# Patient Record
Sex: FEMALE | Race: WHITE
[De-identification: names, ages, dates, MRNs, and addresses within clinical notes are randomized per-mention and may not be internally consistent; named-entity substitution may affect disease eponyms.]

---

## 2018-11-20 PROBLEM — Z00.00 ENCOUNTER FOR PREVENTIVE HEALTH EXAMINATION: Status: ACTIVE | Noted: 2018-11-20

## 2018-12-07 ENCOUNTER — APPOINTMENT (OUTPATIENT)
Dept: GYNECOLOGIC ONCOLOGY | Facility: CLINIC | Age: 78
End: 2018-12-07
Payer: MEDICARE

## 2018-12-07 VITALS
HEIGHT: 66 IN | DIASTOLIC BLOOD PRESSURE: 87 MMHG | WEIGHT: 168 LBS | HEART RATE: 103 BPM | BODY MASS INDEX: 27 KG/M2 | OXYGEN SATURATION: 95 % | SYSTOLIC BLOOD PRESSURE: 154 MMHG | RESPIRATION RATE: 16 BRPM

## 2018-12-07 PROCEDURE — 99213 OFFICE O/P EST LOW 20 MIN: CPT

## 2018-12-07 RX ORDER — ATORVASTATIN CALCIUM 10 MG/1
10 TABLET, FILM COATED ORAL
Refills: 0 | Status: ACTIVE | COMMUNITY

## 2018-12-07 RX ORDER — ESOMEPRAZOLE MAGNESIUM 40 MG/1
40 CAPSULE, DELAYED RELEASE ORAL
Refills: 0 | Status: ACTIVE | COMMUNITY

## 2018-12-07 RX ORDER — ANASTROZOLE TABLETS 1 MG/1
1 TABLET ORAL
Refills: 0 | Status: ACTIVE | COMMUNITY

## 2018-12-07 RX ORDER — VALSARTAN 160 MG/1
160 TABLET ORAL
Refills: 0 | Status: ACTIVE | COMMUNITY

## 2022-02-08 PROBLEM — C54.1 ENDOMETRIAL CANCER: Status: ACTIVE | Noted: 2018-12-07

## 2022-02-09 ENCOUNTER — APPOINTMENT (OUTPATIENT)
Dept: GYNECOLOGIC ONCOLOGY | Facility: CLINIC | Age: 82
End: 2022-02-09
Payer: MEDICARE

## 2022-02-09 VITALS
WEIGHT: 168 LBS | SYSTOLIC BLOOD PRESSURE: 130 MMHG | HEART RATE: 91 BPM | OXYGEN SATURATION: 95 % | BODY MASS INDEX: 27 KG/M2 | HEIGHT: 66 IN | DIASTOLIC BLOOD PRESSURE: 80 MMHG | RESPIRATION RATE: 16 BRPM

## 2022-02-09 DIAGNOSIS — C54.1 MALIGNANT NEOPLASM OF ENDOMETRIUM: ICD-10-CM

## 2022-02-09 PROCEDURE — 99202 OFFICE O/P NEW SF 15 MIN: CPT

## 2022-02-09 NOTE — PHYSICAL EXAM
[Chaperone Present] : A chaperone was present in the examining room during all aspects of the physical examination [Absent] : Adnexa(ae): Absent [Normal] : Bimanual Exam: Normal [FreeTextEntry1] : Conchita Cortez Medical assistant chaperoned during gynecologic exam.

## 2022-02-09 NOTE — HISTORY OF PRESENT ILLNESS
[FreeTextEntry1] : This 82 y/o with a hx of endometrial cancer since 2005 was last seen in December 2018 for an annual svl visit. Patient was advised to follow up in 1 year but failed to do so. Patient returns for an overdue svl visit. She denies any pain or VB. Patient feels well from a gynecological stand point.

## 2022-02-09 NOTE — END OF VISIT
[FreeTextEntry3] : Written by Conchita Cortez, acting as a scribe for Dr. Graham Dupree \par This note accurately reflects the work and decisions made by me.

## 2022-02-09 NOTE — ASSESSMENT
[FreeTextEntry1] : Patient is doing well from a gynecological stand point. I advised the patient that she is over 15 years out from her cancer diagnosis and does not need to be followed by a gyn oncologist any longer. Patient would be followed by a primary gynecologist. Patient understands if she develops any symptoms or concerns in the future she may return to see me at any time.

## 2024-12-18 ENCOUNTER — APPOINTMENT (OUTPATIENT)
Dept: CT IMAGING | Age: 84
End: 2024-12-18
Payer: MEDICARE

## 2024-12-18 ENCOUNTER — HOSPITAL ENCOUNTER (OUTPATIENT)
Age: 84
Setting detail: OBSERVATION
Discharge: HOME | End: 2024-12-19
Attending: PEDIATRICS | Admitting: STUDENT IN AN ORGANIZED HEALTH CARE EDUCATION/TRAINING PROGRAM
Payer: MEDICARE

## 2024-12-18 ENCOUNTER — APPOINTMENT (OUTPATIENT)
Dept: GENERAL RADIOLOGY | Age: 84
End: 2024-12-18
Payer: MEDICARE

## 2024-12-18 DIAGNOSIS — E86.1 HYPOVOLEMIA: ICD-10-CM

## 2024-12-18 DIAGNOSIS — R55 SYNCOPE, UNSPECIFIED SYNCOPE TYPE: ICD-10-CM

## 2024-12-18 DIAGNOSIS — I95.1 ORTHOSTATIC HYPOTENSION: ICD-10-CM

## 2024-12-18 DIAGNOSIS — R00.1 BRADYCARDIA: ICD-10-CM

## 2024-12-18 DIAGNOSIS — R55 SYNCOPE AND COLLAPSE: Primary | ICD-10-CM

## 2024-12-18 DIAGNOSIS — R11.2 NAUSEA AND VOMITING, UNSPECIFIED VOMITING TYPE: ICD-10-CM

## 2024-12-18 LAB
ALBUMIN SERPL-MCNC: 3.8 G/DL (ref 3.5–5.2)
ALP SERPL-CCNC: 69 U/L (ref 35–104)
ALT SERPL-CCNC: 7 U/L (ref 5–33)
ANION GAP SERPL CALCULATED.3IONS-SCNC: 11 MMOL/L (ref 7–19)
AST SERPL-CCNC: 20 U/L (ref 5–32)
BACTERIA URNS QL MICRO: NEGATIVE /HPF
BASOPHILS # BLD: 0 K/UL (ref 0–0.2)
BASOPHILS NFR BLD: 0.2 % (ref 0–1)
BILIRUB SERPL-MCNC: 0.5 MG/DL (ref 0.2–1.2)
BILIRUB UR QL STRIP: NEGATIVE
BUN SERPL-MCNC: 25 MG/DL (ref 8–23)
CALCIUM SERPL-MCNC: 8.9 MG/DL (ref 8.8–10.2)
CHLORIDE SERPL-SCNC: 108 MMOL/L (ref 98–111)
CLARITY UR: CLEAR
CO2 SERPL-SCNC: 23 MMOL/L (ref 22–29)
COLOR UR: YELLOW
CREAT SERPL-MCNC: 0.9 MG/DL (ref 0.5–0.9)
CRYSTALS URNS MICRO: NORMAL /HPF
EOSINOPHIL # BLD: 0.2 K/UL (ref 0–0.6)
EOSINOPHIL NFR BLD: 1.1 % (ref 0–5)
EPI CELLS #/AREA URNS AUTO: 1 /HPF (ref 0–5)
ERYTHROCYTE [DISTWIDTH] IN BLOOD BY AUTOMATED COUNT: 13.2 % (ref 11.5–14.5)
GLUCOSE SERPL-MCNC: 134 MG/DL (ref 70–99)
GLUCOSE UR STRIP.AUTO-MCNC: NEGATIVE MG/DL
HCT VFR BLD AUTO: 39.1 % (ref 37–47)
HGB BLD-MCNC: 12.5 G/DL (ref 12–16)
HGB UR STRIP.AUTO-MCNC: NEGATIVE MG/L
HYALINE CASTS #/AREA URNS AUTO: 2 /HPF (ref 0–8)
IMM GRANULOCYTES # BLD: 0.1 K/UL
KETONES UR STRIP.AUTO-MCNC: ABNORMAL MG/DL
LACTATE BLDV-SCNC: 1.6 MMOL/L (ref 0.5–1.9)
LEUKOCYTE ESTERASE UR QL STRIP.AUTO: ABNORMAL
LYMPHOCYTES # BLD: 1.5 K/UL (ref 1.1–4.5)
LYMPHOCYTES NFR BLD: 9.5 % (ref 20–40)
MAGNESIUM SERPL-MCNC: 2.1 MG/DL (ref 1.6–2.4)
MCH RBC QN AUTO: 31 PG (ref 27–31)
MCHC RBC AUTO-ENTMCNC: 32 G/DL (ref 33–37)
MCV RBC AUTO: 97 FL (ref 81–99)
MONOCYTES # BLD: 0.9 K/UL (ref 0–0.9)
MONOCYTES NFR BLD: 5.4 % (ref 0–10)
NEUTROPHILS # BLD: 13.1 K/UL (ref 1.5–7.5)
NEUTS SEG NFR BLD: 83.3 % (ref 50–65)
NITRITE UR QL STRIP.AUTO: NEGATIVE
PH UR STRIP.AUTO: 5.5 [PH] (ref 5–8)
PLATELET # BLD AUTO: 217 K/UL (ref 130–400)
PMV BLD AUTO: 9.2 FL (ref 9.4–12.3)
POTASSIUM SERPL-SCNC: 3.6 MMOL/L (ref 3.5–5)
PROT SERPL-MCNC: 6 G/DL (ref 6.4–8.3)
PROT UR STRIP.AUTO-MCNC: NEGATIVE MG/DL
RBC # BLD AUTO: 4.03 M/UL (ref 4.2–5.4)
RBC #/AREA URNS AUTO: 3 /HPF (ref 0–4)
SODIUM SERPL-SCNC: 142 MMOL/L (ref 136–145)
SP GR UR STRIP.AUTO: 1.02 (ref 1–1.03)
TROPONIN, HIGH SENSITIVITY: 13 NG/L (ref 0–14)
UROBILINOGEN UR STRIP.AUTO-MCNC: 0.2 E.U./DL
WBC # BLD AUTO: 15.7 K/UL (ref 4.8–10.8)
WBC #/AREA URNS AUTO: 1 /HPF (ref 0–5)

## 2024-12-18 PROCEDURE — 80053 COMPREHEN METABOLIC PANEL: CPT

## 2024-12-18 PROCEDURE — 93005 ELECTROCARDIOGRAM TRACING: CPT | Performed by: PEDIATRICS

## 2024-12-18 PROCEDURE — 84484 ASSAY OF TROPONIN QUANT: CPT

## 2024-12-18 PROCEDURE — 96374 THER/PROPH/DIAG INJ IV PUSH: CPT

## 2024-12-18 PROCEDURE — 99285 EMERGENCY DEPT VISIT HI MDM: CPT

## 2024-12-18 PROCEDURE — 85025 COMPLETE CBC W/AUTO DIFF WBC: CPT

## 2024-12-18 PROCEDURE — 96361 HYDRATE IV INFUSION ADD-ON: CPT

## 2024-12-18 PROCEDURE — 83605 ASSAY OF LACTIC ACID: CPT

## 2024-12-18 PROCEDURE — 2580000003 HC RX 258: Performed by: PEDIATRICS

## 2024-12-18 PROCEDURE — 71045 X-RAY EXAM CHEST 1 VIEW: CPT

## 2024-12-18 PROCEDURE — 36415 COLL VENOUS BLD VENIPUNCTURE: CPT

## 2024-12-18 PROCEDURE — 83735 ASSAY OF MAGNESIUM: CPT

## 2024-12-18 PROCEDURE — 6360000002 HC RX W HCPCS: Performed by: PEDIATRICS

## 2024-12-18 PROCEDURE — 81001 URINALYSIS AUTO W/SCOPE: CPT

## 2024-12-18 PROCEDURE — 70450 CT HEAD/BRAIN W/O DYE: CPT

## 2024-12-18 RX ORDER — VALSARTAN AND HYDROCHLOROTHIAZIDE 160; 12.5 MG/1; MG/1
1 TABLET, FILM COATED ORAL DAILY
Status: ON HOLD | COMMUNITY
End: 2024-12-19 | Stop reason: HOSPADM

## 2024-12-18 RX ORDER — ATORVASTATIN CALCIUM 10 MG/1
10 TABLET, FILM COATED ORAL DAILY
COMMUNITY

## 2024-12-18 RX ORDER — ANASTROZOLE 1 MG/1
1 TABLET ORAL DAILY
COMMUNITY

## 2024-12-18 RX ORDER — 0.9 % SODIUM CHLORIDE 0.9 %
1000 INTRAVENOUS SOLUTION INTRAVENOUS ONCE
Status: COMPLETED | OUTPATIENT
Start: 2024-12-18 | End: 2024-12-19

## 2024-12-18 RX ORDER — ONDANSETRON 2 MG/ML
4 INJECTION INTRAMUSCULAR; INTRAVENOUS ONCE
Status: COMPLETED | OUTPATIENT
Start: 2024-12-18 | End: 2024-12-18

## 2024-12-18 RX ORDER — ASPIRIN 81 MG/1
81 TABLET, CHEWABLE ORAL DAILY
COMMUNITY

## 2024-12-18 RX ADMIN — SODIUM CHLORIDE 1000 ML: 9 INJECTION, SOLUTION INTRAVENOUS at 22:19

## 2024-12-18 RX ADMIN — ONDANSETRON 4 MG: 2 INJECTION INTRAMUSCULAR; INTRAVENOUS at 22:36

## 2024-12-18 ASSESSMENT — ENCOUNTER SYMPTOMS
ABDOMINAL PAIN: 0
SHORTNESS OF BREATH: 0
BACK PAIN: 0
COUGH: 0
RHINORRHEA: 0
NAUSEA: 1
VOMITING: 0
DIARRHEA: 1

## 2024-12-18 ASSESSMENT — PAIN - FUNCTIONAL ASSESSMENT: PAIN_FUNCTIONAL_ASSESSMENT: NONE - DENIES PAIN

## 2024-12-19 ENCOUNTER — APPOINTMENT (OUTPATIENT)
Dept: CT IMAGING | Age: 84
End: 2024-12-19
Payer: MEDICARE

## 2024-12-19 ENCOUNTER — APPOINTMENT (OUTPATIENT)
Age: 84
End: 2024-12-19
Attending: STUDENT IN AN ORGANIZED HEALTH CARE EDUCATION/TRAINING PROGRAM
Payer: MEDICARE

## 2024-12-19 VITALS
RESPIRATION RATE: 16 BRPM | BODY MASS INDEX: 26.36 KG/M2 | HEART RATE: 82 BPM | OXYGEN SATURATION: 96 % | TEMPERATURE: 97.6 F | SYSTOLIC BLOOD PRESSURE: 138 MMHG | HEIGHT: 66 IN | DIASTOLIC BLOOD PRESSURE: 49 MMHG | WEIGHT: 164 LBS

## 2024-12-19 PROBLEM — R55 SYNCOPE AND COLLAPSE: Status: RESOLVED | Noted: 2024-12-19 | Resolved: 2024-12-19

## 2024-12-19 PROBLEM — R55 SYNCOPE AND COLLAPSE: Status: ACTIVE | Noted: 2024-12-19

## 2024-12-19 LAB
ALBUMIN SERPL-MCNC: 3.8 G/DL (ref 3.5–5.2)
ALP SERPL-CCNC: 70 U/L (ref 35–104)
ALT SERPL-CCNC: 7 U/L (ref 5–33)
ANION GAP SERPL CALCULATED.3IONS-SCNC: 12 MMOL/L (ref 7–19)
AST SERPL-CCNC: 16 U/L (ref 5–32)
BASOPHILS # BLD: 0 K/UL (ref 0–0.2)
BASOPHILS NFR BLD: 0.1 % (ref 0–1)
BILIRUB SERPL-MCNC: 0.9 MG/DL (ref 0.2–1.2)
BUN SERPL-MCNC: 26 MG/DL (ref 8–23)
CALCIUM SERPL-MCNC: 8.1 MG/DL (ref 8.8–10.2)
CHLORIDE SERPL-SCNC: 106 MMOL/L (ref 98–111)
CO2 SERPL-SCNC: 23 MMOL/L (ref 22–29)
CREAT SERPL-MCNC: 0.7 MG/DL (ref 0.5–0.9)
ECHO AO ASC DIAM: 3.1 CM
ECHO AO ASCENDING AORTA INDEX: 1.68 CM/M2
ECHO AO ROOT DIAM: 2.1 CM
ECHO AO ROOT INDEX: 1.14 CM/M2
ECHO AO SINUS VALSALVA DIAM: 3.4 CM
ECHO AO SINUS VALSALVA INDEX: 1.85 CM/M2
ECHO AO ST JNCT DIAM: 3.2 CM
ECHO AV AREA PEAK VELOCITY: 2.5 CM2
ECHO AV AREA VTI: 3.3 CM2
ECHO AV AREA/BSA PEAK VELOCITY: 1.4 CM2/M2
ECHO AV AREA/BSA VTI: 1.8 CM2/M2
ECHO AV MEAN GRADIENT: 4 MMHG
ECHO AV MEAN VELOCITY: 1 M/S
ECHO AV PEAK GRADIENT: 7 MMHG
ECHO AV PEAK VELOCITY: 1.3 M/S
ECHO AV VELOCITY RATIO: 0.77
ECHO AV VTI: 25.5 CM
ECHO BSA: 1.86 M2
ECHO IVC PROX: 1.3 CM
ECHO LA AREA 2C: 16.8 CM2
ECHO LA AREA 4C: 18.9 CM2
ECHO LA DIAMETER INDEX: 1.85 CM/M2
ECHO LA DIAMETER: 3.4 CM
ECHO LA MAJOR AXIS: 6.8 CM
ECHO LA MINOR AXIS: 6.1 CM
ECHO LA TO AORTIC ROOT RATIO: 1.62
ECHO LA VOL BP: 40 ML (ref 22–52)
ECHO LA VOL MOD A2C: 39 ML (ref 22–52)
ECHO LA VOL MOD A4C: 42 ML (ref 22–52)
ECHO LA VOL/BSA BIPLANE: 22 ML/M2 (ref 16–34)
ECHO LA VOLUME INDEX MOD A2C: 21 ML/M2 (ref 16–34)
ECHO LA VOLUME INDEX MOD A4C: 23 ML/M2 (ref 16–34)
ECHO LV E' LATERAL VELOCITY: 5.87 CM/S
ECHO LV E' SEPTAL VELOCITY: 5.66 CM/S
ECHO LV EDV A2C: 83 ML
ECHO LV EDV A4C: 93 ML
ECHO LV EDV INDEX A4C: 51 ML/M2
ECHO LV EDV NDEX A2C: 45 ML/M2
ECHO LV EJECTION FRACTION A2C: 55 %
ECHO LV EJECTION FRACTION A4C: 55 %
ECHO LV EJECTION FRACTION BIPLANE: 55 % (ref 55–100)
ECHO LV ESV A2C: 37 ML
ECHO LV ESV A4C: 42 ML
ECHO LV ESV INDEX A2C: 20 ML/M2
ECHO LV ESV INDEX A4C: 23 ML/M2
ECHO LV FRACTIONAL SHORTENING: 17 % (ref 28–44)
ECHO LV INTERNAL DIMENSION DIASTOLE INDEX: 2.5 CM/M2
ECHO LV INTERNAL DIMENSION DIASTOLIC: 4.6 CM (ref 3.9–5.3)
ECHO LV INTERNAL DIMENSION SYSTOLIC INDEX: 2.07 CM/M2
ECHO LV INTERNAL DIMENSION SYSTOLIC: 3.8 CM
ECHO LV ISOVOLUMETRIC RELAXATION TIME (IVRT): 106 MS
ECHO LV IVSD: 1.7 CM (ref 0.6–0.9)
ECHO LV MASS 2D: 284.8 G (ref 67–162)
ECHO LV MASS INDEX 2D: 154.8 G/M2 (ref 43–95)
ECHO LV POSTERIOR WALL DIASTOLIC: 1.3 CM (ref 0.6–0.9)
ECHO LV RELATIVE WALL THICKNESS RATIO: 0.57
ECHO LVOT AREA: 3.1 CM2
ECHO LVOT AV VTI INDEX: 1.05
ECHO LVOT DIAM: 2 CM
ECHO LVOT MEAN GRADIENT: 2 MMHG
ECHO LVOT PEAK GRADIENT: 4 MMHG
ECHO LVOT PEAK VELOCITY: 1 M/S
ECHO LVOT STROKE VOLUME INDEX: 45.9 ML/M2
ECHO LVOT SV: 84.5 ML
ECHO LVOT VTI: 26.9 CM
ECHO MV A VELOCITY: 1.21 M/S
ECHO MV ANNULUS DIAMETER: 2.2 CM
ECHO MV AREA VTI: 4.1 CM2
ECHO MV E DECELERATION TIME (DT): 160 MS
ECHO MV E VELOCITY: 0.63 M/S
ECHO MV E/A RATIO: 0.52
ECHO MV E/E' LATERAL: 10.73
ECHO MV E/E' RATIO (AVERAGED): 10.93
ECHO MV E/E' SEPTAL: 11.13
ECHO MV LVOT VTI INDEX: 0.77
ECHO MV MAX VELOCITY: 1 M/S
ECHO MV MEAN GRADIENT: 2 MMHG
ECHO MV MEAN VELOCITY: 0.6 M/S
ECHO MV PEAK GRADIENT: 4 MMHG
ECHO MV VTI: 20.7 CM
ECHO RA AREA 4C: 10.9 CM2
ECHO RA END SYSTOLIC VOLUME APICAL 4 CHAMBER INDEX BSA: 12 ML/M2
ECHO RA MAJOR AXIS INDEX: 2.55 CM/M2
ECHO RA MAJOR AXIS: 4.7 CM
ECHO RA MINOR AXIS INDEX: 1.25 CM/M2
ECHO RA MINOR AXIS: 2.3 CM
ECHO RA VOLUME: 22 ML
ECHO RV BASAL DIMENSION: 2.3 CM
ECHO RV INTERNAL DIMENSION: 3 CM
ECHO RV LONGITUDINAL DIMENSION: 7.7 CM
ECHO RV MID DIMENSION: 2 CM
ECHO RV TAPSE: 1.9 CM (ref 1.7–?)
EKG P AXIS: 52 DEGREES
EKG P AXIS: 56 DEGREES
EKG P-R INTERVAL: 160 MS
EKG P-R INTERVAL: 168 MS
EKG Q-T INTERVAL: 370 MS
EKG Q-T INTERVAL: 374 MS
EKG QRS DURATION: 88 MS
EKG QRS DURATION: 90 MS
EKG QTC CALCULATION (BAZETT): 424 MS
EKG QTC CALCULATION (BAZETT): 426 MS
EKG T AXIS: 64 DEGREES
EKG T AXIS: 95 DEGREES
EOSINOPHIL # BLD: 0 K/UL (ref 0–0.6)
EOSINOPHIL NFR BLD: 0.6 % (ref 0–5)
ERYTHROCYTE [DISTWIDTH] IN BLOOD BY AUTOMATED COUNT: 13.3 % (ref 11.5–14.5)
GLUCOSE SERPL-MCNC: 126 MG/DL (ref 70–99)
HCT VFR BLD AUTO: 38.4 % (ref 37–47)
HGB BLD-MCNC: 12.4 G/DL (ref 12–16)
IMM GRANULOCYTES # BLD: 0 K/UL
LYMPHOCYTES # BLD: 0.3 K/UL (ref 1.1–4.5)
LYMPHOCYTES NFR BLD: 4.6 % (ref 20–40)
MCH RBC QN AUTO: 31.1 PG (ref 27–31)
MCHC RBC AUTO-ENTMCNC: 32.3 G/DL (ref 33–37)
MCV RBC AUTO: 96.2 FL (ref 81–99)
MONOCYTES # BLD: 0.2 K/UL (ref 0–0.9)
MONOCYTES NFR BLD: 3 % (ref 0–10)
NEUTROPHILS # BLD: 6.6 K/UL (ref 1.5–7.5)
NEUTS SEG NFR BLD: 91.3 % (ref 50–65)
PHOSPHATE SERPL-MCNC: 3.2 MG/DL (ref 2.5–4.5)
PLATELET # BLD AUTO: 179 K/UL (ref 130–400)
PMV BLD AUTO: 9.7 FL (ref 9.4–12.3)
POTASSIUM SERPL-SCNC: 3.7 MMOL/L (ref 3.5–5)
PROT SERPL-MCNC: 5.7 G/DL (ref 6.4–8.3)
RBC # BLD AUTO: 3.99 M/UL (ref 4.2–5.4)
SODIUM SERPL-SCNC: 141 MMOL/L (ref 136–145)
TROPONIN, HIGH SENSITIVITY: 8 NG/L (ref 0–14)
WBC # BLD AUTO: 7.2 K/UL (ref 4.8–10.8)

## 2024-12-19 PROCEDURE — 6370000000 HC RX 637 (ALT 250 FOR IP): Performed by: STUDENT IN AN ORGANIZED HEALTH CARE EDUCATION/TRAINING PROGRAM

## 2024-12-19 PROCEDURE — 96376 TX/PRO/DX INJ SAME DRUG ADON: CPT

## 2024-12-19 PROCEDURE — 6360000004 HC RX CONTRAST MEDICATION: Performed by: PEDIATRICS

## 2024-12-19 PROCEDURE — 93005 ELECTROCARDIOGRAM TRACING: CPT | Performed by: PEDIATRICS

## 2024-12-19 PROCEDURE — 96361 HYDRATE IV INFUSION ADD-ON: CPT

## 2024-12-19 PROCEDURE — 74177 CT ABD & PELVIS W/CONTRAST: CPT

## 2024-12-19 PROCEDURE — 6360000004 HC RX CONTRAST MEDICATION: Performed by: STUDENT IN AN ORGANIZED HEALTH CARE EDUCATION/TRAINING PROGRAM

## 2024-12-19 PROCEDURE — 36415 COLL VENOUS BLD VENIPUNCTURE: CPT

## 2024-12-19 PROCEDURE — 84484 ASSAY OF TROPONIN QUANT: CPT

## 2024-12-19 PROCEDURE — 84100 ASSAY OF PHOSPHORUS: CPT

## 2024-12-19 PROCEDURE — 93306 TTE W/DOPPLER COMPLETE: CPT | Performed by: INTERNAL MEDICINE

## 2024-12-19 PROCEDURE — 97116 GAIT TRAINING THERAPY: CPT

## 2024-12-19 PROCEDURE — 2500000003 HC RX 250 WO HCPCS: Performed by: STUDENT IN AN ORGANIZED HEALTH CARE EDUCATION/TRAINING PROGRAM

## 2024-12-19 PROCEDURE — 97165 OT EVAL LOW COMPLEX 30 MIN: CPT

## 2024-12-19 PROCEDURE — 80053 COMPREHEN METABOLIC PANEL: CPT

## 2024-12-19 PROCEDURE — C8929 TTE W OR WO FOL WCON,DOPPLER: HCPCS

## 2024-12-19 PROCEDURE — 6360000002 HC RX W HCPCS: Performed by: STUDENT IN AN ORGANIZED HEALTH CARE EDUCATION/TRAINING PROGRAM

## 2024-12-19 PROCEDURE — 94760 N-INVAS EAR/PLS OXIMETRY 1: CPT

## 2024-12-19 PROCEDURE — 97535 SELF CARE MNGMENT TRAINING: CPT

## 2024-12-19 PROCEDURE — G0378 HOSPITAL OBSERVATION PER HR: HCPCS

## 2024-12-19 PROCEDURE — 97530 THERAPEUTIC ACTIVITIES: CPT

## 2024-12-19 PROCEDURE — 6360000002 HC RX W HCPCS: Performed by: PEDIATRICS

## 2024-12-19 PROCEDURE — 85025 COMPLETE CBC W/AUTO DIFF WBC: CPT

## 2024-12-19 PROCEDURE — 2580000003 HC RX 258: Performed by: STUDENT IN AN ORGANIZED HEALTH CARE EDUCATION/TRAINING PROGRAM

## 2024-12-19 PROCEDURE — 97161 PT EVAL LOW COMPLEX 20 MIN: CPT

## 2024-12-19 RX ORDER — ATORVASTATIN CALCIUM 10 MG/1
10 TABLET, FILM COATED ORAL DAILY
Status: DISCONTINUED | OUTPATIENT
Start: 2024-12-19 | End: 2024-12-19 | Stop reason: HOSPADM

## 2024-12-19 RX ORDER — ACETAMINOPHEN 325 MG/1
650 TABLET ORAL EVERY 6 HOURS PRN
Status: DISCONTINUED | OUTPATIENT
Start: 2024-12-19 | End: 2024-12-19 | Stop reason: HOSPADM

## 2024-12-19 RX ORDER — MAGNESIUM SULFATE IN WATER 40 MG/ML
2000 INJECTION, SOLUTION INTRAVENOUS PRN
Status: DISCONTINUED | OUTPATIENT
Start: 2024-12-19 | End: 2024-12-19 | Stop reason: HOSPADM

## 2024-12-19 RX ORDER — POTASSIUM CHLORIDE 1500 MG/1
40 TABLET, EXTENDED RELEASE ORAL PRN
Status: DISCONTINUED | OUTPATIENT
Start: 2024-12-19 | End: 2024-12-19 | Stop reason: HOSPADM

## 2024-12-19 RX ORDER — ANASTROZOLE 1 MG/1
1 TABLET ORAL DAILY
Status: DISCONTINUED | OUTPATIENT
Start: 2024-12-19 | End: 2024-12-19 | Stop reason: HOSPADM

## 2024-12-19 RX ORDER — ACETAMINOPHEN 650 MG/1
650 SUPPOSITORY RECTAL EVERY 6 HOURS PRN
Status: DISCONTINUED | OUTPATIENT
Start: 2024-12-19 | End: 2024-12-19 | Stop reason: HOSPADM

## 2024-12-19 RX ORDER — SODIUM CHLORIDE 0.9 % (FLUSH) 0.9 %
5-40 SYRINGE (ML) INJECTION EVERY 12 HOURS SCHEDULED
Status: DISCONTINUED | OUTPATIENT
Start: 2024-12-19 | End: 2024-12-19 | Stop reason: HOSPADM

## 2024-12-19 RX ORDER — ONDANSETRON 2 MG/ML
4 INJECTION INTRAMUSCULAR; INTRAVENOUS EVERY 6 HOURS PRN
Status: DISCONTINUED | OUTPATIENT
Start: 2024-12-19 | End: 2024-12-19 | Stop reason: HOSPADM

## 2024-12-19 RX ORDER — SODIUM CHLORIDE, SODIUM LACTATE, POTASSIUM CHLORIDE, CALCIUM CHLORIDE 600; 310; 30; 20 MG/100ML; MG/100ML; MG/100ML; MG/100ML
INJECTION, SOLUTION INTRAVENOUS CONTINUOUS
Status: DISCONTINUED | OUTPATIENT
Start: 2024-12-19 | End: 2024-12-19 | Stop reason: HOSPADM

## 2024-12-19 RX ORDER — VALSARTAN AND HYDROCHLOROTHIAZIDE 160; 12.5 MG/1; MG/1
1 TABLET, FILM COATED ORAL DAILY
Status: DISCONTINUED | OUTPATIENT
Start: 2024-12-19 | End: 2024-12-19 | Stop reason: CLARIF

## 2024-12-19 RX ORDER — POLYETHYLENE GLYCOL 3350 17 G/17G
17 POWDER, FOR SOLUTION ORAL DAILY PRN
Status: DISCONTINUED | OUTPATIENT
Start: 2024-12-19 | End: 2024-12-19 | Stop reason: HOSPADM

## 2024-12-19 RX ORDER — SODIUM CHLORIDE 9 MG/ML
INJECTION, SOLUTION INTRAVENOUS PRN
Status: DISCONTINUED | OUTPATIENT
Start: 2024-12-19 | End: 2024-12-19 | Stop reason: HOSPADM

## 2024-12-19 RX ORDER — ONDANSETRON 2 MG/ML
4 INJECTION INTRAMUSCULAR; INTRAVENOUS ONCE
Status: COMPLETED | OUTPATIENT
Start: 2024-12-19 | End: 2024-12-19

## 2024-12-19 RX ORDER — POTASSIUM CHLORIDE 7.45 MG/ML
10 INJECTION INTRAVENOUS PRN
Status: DISCONTINUED | OUTPATIENT
Start: 2024-12-19 | End: 2024-12-19 | Stop reason: HOSPADM

## 2024-12-19 RX ORDER — ONDANSETRON 2 MG/ML
INJECTION INTRAMUSCULAR; INTRAVENOUS
Status: DISPENSED
Start: 2024-12-19 | End: 2024-12-19

## 2024-12-19 RX ORDER — HYDROCHLOROTHIAZIDE 25 MG/1
25 TABLET ORAL DAILY
Status: DISCONTINUED | OUTPATIENT
Start: 2024-12-19 | End: 2024-12-19 | Stop reason: HOSPADM

## 2024-12-19 RX ORDER — SODIUM CHLORIDE 0.9 % (FLUSH) 0.9 %
5-40 SYRINGE (ML) INJECTION PRN
Status: DISCONTINUED | OUTPATIENT
Start: 2024-12-19 | End: 2024-12-19 | Stop reason: HOSPADM

## 2024-12-19 RX ORDER — PANTOPRAZOLE SODIUM 40 MG/1
40 TABLET, DELAYED RELEASE ORAL
Status: DISCONTINUED | OUTPATIENT
Start: 2024-12-19 | End: 2024-12-19 | Stop reason: HOSPADM

## 2024-12-19 RX ORDER — VALSARTAN 160 MG/1
160 TABLET ORAL DAILY
Status: DISCONTINUED | OUTPATIENT
Start: 2024-12-19 | End: 2024-12-19 | Stop reason: HOSPADM

## 2024-12-19 RX ORDER — ONDANSETRON 4 MG/1
4 TABLET, ORALLY DISINTEGRATING ORAL EVERY 8 HOURS PRN
Status: DISCONTINUED | OUTPATIENT
Start: 2024-12-19 | End: 2024-12-19 | Stop reason: HOSPADM

## 2024-12-19 RX ORDER — ENOXAPARIN SODIUM 100 MG/ML
40 INJECTION SUBCUTANEOUS DAILY
Status: DISCONTINUED | OUTPATIENT
Start: 2024-12-19 | End: 2024-12-19 | Stop reason: HOSPADM

## 2024-12-19 RX ORDER — ONDANSETRON 4 MG/1
4 TABLET, ORALLY DISINTEGRATING ORAL EVERY 8 HOURS PRN
Qty: 9 TABLET | Refills: 0 | Status: SHIPPED | OUTPATIENT
Start: 2024-12-19 | End: 2024-12-22

## 2024-12-19 RX ORDER — ASPIRIN 81 MG/1
81 TABLET, CHEWABLE ORAL DAILY
Status: DISCONTINUED | OUTPATIENT
Start: 2024-12-19 | End: 2024-12-19 | Stop reason: HOSPADM

## 2024-12-19 RX ORDER — IOPAMIDOL 755 MG/ML
70 INJECTION, SOLUTION INTRAVASCULAR
Status: COMPLETED | OUTPATIENT
Start: 2024-12-19 | End: 2024-12-19

## 2024-12-19 RX ADMIN — PANTOPRAZOLE SODIUM 40 MG: 40 TABLET, DELAYED RELEASE ORAL at 05:25

## 2024-12-19 RX ADMIN — ATORVASTATIN CALCIUM 10 MG: 10 TABLET, FILM COATED ORAL at 09:01

## 2024-12-19 RX ADMIN — SODIUM CHLORIDE, PRESERVATIVE FREE 10 ML: 5 INJECTION INTRAVENOUS at 09:01

## 2024-12-19 RX ADMIN — ENOXAPARIN SODIUM 40 MG: 100 INJECTION SUBCUTANEOUS at 09:01

## 2024-12-19 RX ADMIN — SODIUM CHLORIDE, POTASSIUM CHLORIDE, SODIUM LACTATE AND CALCIUM CHLORIDE: 600; 310; 30; 20 INJECTION, SOLUTION INTRAVENOUS at 03:03

## 2024-12-19 RX ADMIN — IOPAMIDOL 70 ML: 755 INJECTION, SOLUTION INTRAVENOUS at 01:58

## 2024-12-19 RX ADMIN — ONDANSETRON 4 MG: 2 INJECTION INTRAMUSCULAR; INTRAVENOUS at 01:31

## 2024-12-19 RX ADMIN — ASPIRIN 81 MG: 81 TABLET, CHEWABLE ORAL at 09:01

## 2024-12-19 RX ADMIN — SULFUR HEXAFLUORIDE 2 ML: 60.7; .19; .19 INJECTION, POWDER, LYOPHILIZED, FOR SUSPENSION INTRAVENOUS; INTRAVESICAL at 07:46

## 2024-12-19 RX ADMIN — ANASTROZOLE 1 MG: 1 TABLET, COATED ORAL at 09:01

## 2024-12-19 NOTE — DISCHARGE SUMMARY
Effort: Pulmonary effort is normal.      Breath sounds: Normal breath sounds.   Abdominal:      General: Bowel sounds are normal. There is no distension.      Palpations: Abdomen is soft.      Tenderness: There is no abdominal tenderness.   Musculoskeletal:      Right lower leg: No edema.      Left lower leg: No edema.   Skin:     General: Skin is warm and dry.   Neurological:      Mental Status: She is alert and oriented to person, place, and time.   Psychiatric:         Mood and Affect: Mood normal.         Behavior: Behavior normal.         Discharge Medications:         Medication List        START taking these medications      ondansetron 4 MG disintegrating tablet  Commonly known as: ZOFRAN-ODT  Take 1 tablet by mouth every 8 hours as needed for Nausea or Vomiting            CONTINUE taking these medications      anastrozole 1 MG tablet  Commonly known as: ARIMIDEX     aspirin 81 MG chewable tablet     atorvastatin 10 MG tablet  Commonly known as: LIPITOR     OMEPRAZOLE PO            STOP taking these medications      valsartan-hydroCHLOROthiazide 160-12.5 MG per tablet  Commonly known as: DIOVAN-HCT               Where to Get Your Medications        These medications were sent to [id number] - [place], [place] - [address] [facility] - P [phone] - F [phone]  [address] [facility] [address]      Phone: [phone]   ondansetron 4 MG disintegrating tablet         Discharge Instructions:   Follow up with PCP on return to [place].   Take medications as directed.  Resume activity as tolerated.    Diet: ADULT DIET; Regular; Low Fat/Low Chol/High Fiber/ARTHUR     Disposition: Patient is Stable and will be discharged to Home.    Time spent on discharge  35 minutes spent in assessing patient, reviewing medications, discussion with nursing, confirming safe discharge plan and preparation of discharge summary.    Signed:  KINDRA Oneil - CNP, 12/19/2024 12:46 PM

## 2024-12-19 NOTE — PLAN OF CARE
Problem: Skin/Tissue Integrity  Goal: Absence of new skin breakdown  Description: 1.  Monitor for areas of redness and/or skin breakdown  2.  Assess vascular access sites hourly  3.  Every 4-6 hours minimum:  Change oxygen saturation probe site  4.  Every 4-6 hours:  If on nasal continuous positive airway pressure, respiratory therapy assess nares and determine need for appliance change or resting period.  Outcome: Progressing     Problem: ABCDS Injury Assessment  Goal: Absence of physical injury  Outcome: Progressing     Problem: Discharge Planning  Goal: Discharge to home or other facility with appropriate resources  Outcome: Progressing  Flowsheets (Taken 12/19/2024 0258)  Discharge to home or other facility with appropriate resources:   Identify barriers to discharge with patient and caregiver   Arrange for needed discharge resources and transportation as appropriate   Arrange for interpreters to assist at discharge as needed   Identify discharge learning needs (meds, wound care, etc)   Refer to discharge planning if patient needs post-hospital services based on physician order or complex needs related to functional status, cognitive ability or social support system     Problem: Safety - Adult  Goal: Free from fall injury  Outcome: Progressing

## 2024-12-19 NOTE — PLAN OF CARE
Problem: Skin/Tissue Integrity  Goal: Absence of new skin breakdown  Description: 1.  Monitor for areas of redness and/or skin breakdown  2.  Assess vascular access sites hourly  3.  Every 4-6 hours minimum:  Change oxygen saturation probe site  4.  Every 4-6 hours:  If on nasal continuous positive airway pressure, respiratory therapy assess nares and determine need for appliance change or resting period.  12/19/2024 1040 by Mary Solano RN  Outcome: Progressing  12/19/2024 0317 by Yessenia Phan LPN  Outcome: Progressing     Problem: ABCDS Injury Assessment  Goal: Absence of physical injury  12/19/2024 1040 by Mary Solano RN  Outcome: Progressing  12/19/2024 0317 by Yessenia Phan LPN  Outcome: Progressing     Problem: Discharge Planning  Goal: Discharge to home or other facility with appropriate resources  12/19/2024 1040 by Mary Solano RN  Outcome: Progressing  Flowsheets (Taken 12/19/2024 0901)  Discharge to home or other facility with appropriate resources: Identify barriers to discharge with patient and caregiver  12/19/2024 0317 by Yessenia Phan LPN  Outcome: Progressing  Flowsheets (Taken 12/19/2024 0258)  Discharge to home or other facility with appropriate resources:   Identify barriers to discharge with patient and caregiver   Arrange for needed discharge resources and transportation as appropriate   Arrange for interpreters to assist at discharge as needed   Identify discharge learning needs (meds, wound care, etc)   Refer to discharge planning if patient needs post-hospital services based on physician order or complex needs related to functional status, cognitive ability or social support system     Problem: Safety - Adult  Goal: Free from fall injury  12/19/2024 1040 by Mary Solano RN  Outcome: Progressing  12/19/2024 0317 by Yessenia Phan LPN  Outcome: Progressing     Problem: Safety - Adult  Goal: Free from fall

## 2024-12-19 NOTE — H&P
Labs     12/18/24 2021   WBC 15.7*   HGB 12.5   HCT 39.1        BMP:  Recent Labs     12/18/24 2021      K 3.6      CO2 23   BUN 25*   CREATININE 0.9   CALCIUM 8.9     Recent Labs     12/18/24 2021   AST 20   ALT 7   BILITOT 0.5   ALKPHOS 69     Coag Panel: No results for input(s): \"INR\", \"PROTIME\", \"APTT\" in the last 72 hours.  Cardiac Enzymes: No results for input(s): \"CKTOTAL\", \"TROPONINI\" in the last 72 hours.  ABGs:No results found for: \"PHART\", \"PO2ART\", \"RAD8SSK\"  Urinalysis:  Lab Results   Component Value Date/Time    NITRU Negative 12/18/2024 10:42 PM    WBCUA 1 12/18/2024 10:42 PM    BACTERIA Negative 12/18/2024 10:42 PM    RBCUA 3 12/18/2024 10:42 PM    BLOODU Negative 12/18/2024 10:42 PM    GLUCOSEU Negative 12/18/2024 10:42 PM       Active Hospital Problems    Diagnosis Date Noted    Syncope and collapse [R55] 12/19/2024       Assessment and Plan:  Principal Problem:    Syncope and collapse  Resolved Problems:    * No resolved hospital problems. *    # Syncope and collapse  # Orthostatic hypotension  - Admit to observation  - Monitor on telemetry  - PT/OT to evaluate and treat  - IV fluids LR at 75 cc/h  - Hold home antihypertensives for now  - Repeat labs in the morning  - Echocardiogram in the morning    DVT prophylaxis with Lovenox       Dr. [facility]  Internal Medicine    This note was recorded using dictation software and has been proofread for accuracy but there may be grammatical inaccuracies due to missed recognition of speech within the dictation software.  Please apply one's own clinical judgment and clarify any details that may not be clear.

## 2024-12-19 NOTE — ED NOTES
During orthostatic bp's, pt became very lightheaded while standing.     Dr. Cassidy at bedside.  Assisted pt back onto stretcher.     Pt became very nauseated and had episode of emesis.    4mg zofran IV given per verbal order Dr. Cassidy.   
  Allergen Reactions    Doxycycline Other (See Comments)     Pt reports \"fainting\"    Penicillins Rash     Current Medications:   Medications Administered         iopamidol (ISOVUE-370) 76 % injection 70 mL Admin Date  12/19/2024 Action  Given Dose  70 mL Rate   Route  IntraVENous Documented By  Yarelis Marshall        ondansetron (ZOFRAN) injection 4 mg Admin Date  12/18/2024 Action  Given Dose  4 mg Rate   Route  IntraVENous Documented By  Aury Trujillo, SHAHID        ondansetron (ZOFRAN) injection 4 mg Admin Date  12/19/2024 Action  Given Dose  4 mg Rate   Route  IntraVENous Documented By  Aury Trujillo, SHAHID        sodium chloride 0.9 % bolus 1,000 mL Admin Date  12/18/2024 Action  New Bag Dose  1,000 mL Rate  500 mL/hr Route  IntraVENous Documented By  Aury Trujillo RN            History:   Past Medical History:   Diagnosis Date    Brain aneurysm 1994    Breast CA (HCC)     left    Uterine cancer (HCC)     Vaginal prolapse        Assessment  Vitals: Level of Consciousness: Alert (0)   Vitals:    12/18/24 2020 12/18/24 2320 12/19/24 0100 12/19/24 0130   BP: (!) 130/58 (!) 122/48 (!) 115/50 (!) 141/73   Pulse: 85 87 90 (!) 102   Resp: 20 20 17 22   Temp: 97.7 °F (36.5 °C)      TempSrc: Oral      SpO2: 96% 94%  96%   Weight: 73.5 kg (162 lb)      Height: 1.676 m (5' 6\")        Predictive Model Details          31  Factor Value    Calculated 12/19/2024 02:14 42% Age 84 years old    Deterioration Index Model 26% Respiratory rate 22     12% WBC count abnormal (15.7 K/uL)     8% Pulse 102     5% Systolic 141     3% BUN abnormal (25 mg/dL)     3% Sodium 142 mmol/L     2% Potassium 3.6 mmol/L     0% Temperature 97.7 °F (36.5 °C)     0% Pulse oximetry 96 %     0% Hematocrit 39.1 %       NPO? No  O2 Flow Rate: O2 Device: None (Room air)    Cardiac Rhythm: Sinus   NIH Score: NIH     Active LDA's:   Peripheral IV 12/18/24 Right Antecubital (Active)   Site Assessment Clean, dry & intact 12/18/24 2045   Line Status Blood

## 2024-12-19 NOTE — PROGRESS NOTES
4 Eyes Skin Assessment     NAME:  Sheyla Pyle  YOB: 1940  MEDICAL RECORD NUMBER:  [id number]    The patient is being assessed for  Admission    I agree that at least one RN has performed a thorough Head to Toe Skin Assessment on the patient. ALL assessment sites listed below have been assessed.      Areas assessed by both nurses:    Head, Face, Ears, Shoulders, Back, Chest, Arms, Elbows, Hands, Sacrum. Buttock, Coccyx, Ischium, Legs. Feet and Heels, and Under Medical Devices         Does the Patient have a Wound? Yes wound(s) were present on assessment. LDA wound assessment was Initiated and completed by RN- Redness on heels.        Ricky Prevention initiated by RN: Yes  Wound Care Orders initiated by RN: No    Pressure Injury (Stage 3,4, Unstageable, DTI, NWPT, and Complex wounds) if present, place Wound referral order by RN under : No    New Ostomies, if present place, Ostomy referral order under : No     Nurse 1 eSignature: Electronically signed by Veronica Garrett RN on 12/19/24 at 2:48 AM CST    **SHARE this note so that the co-signing nurse can place an eSignature**    Nurse 2 eSignature: Electronically signed by Yessenia Phan LPN on 12/19/24 at 3:19 AM CST    
Patient pending DC home.  IV access and telemetry discontinued.  Patient waiting on ride home.  
Patient stated she does not need a walker.  She has one at home in [place]   Called [facility] walker  
Physical Therapy  Facility/Department: [facility] 3 ROLDAN/VAS/MED  Physical Therapy Initial Assessment    Name: Sheyla Pyle  : 1940  MRN: [id number]  Date of Service: 2024    Discharge Recommendations:  Home with assist PRN          Patient Diagnosis(es): The primary encounter diagnosis was Syncope and collapse. Diagnoses of Hypovolemia, Bradycardia, Nausea and vomiting, unspecified vomiting type, Orthostatic hypotension, and Syncope, unspecified syncope type were also pertinent to this visit.  Past Medical History:  has a past medical history of Brain aneurysm, Breast CA (HCC), Uterine cancer (HCC), and Vaginal prolapse.  Past Surgical History:  has a past surgical history that includes brain surgery; Hysterectomy; ovarian cyst removal; Tonsillectomy; and knee surgery (Right).    Assessment  Body Structures, Functions, Activity Limitations Requiring Skilled Therapeutic Intervention: Decreased balance;Decreased functional mobility   Assessment: Pt appears safe to dc home with use of rw and assist from family  Therapy Prognosis: Good  Decision Making: Low Complexity  Requires PT Follow-Up: No  Activity Tolerance  Activity Tolerance: Patient tolerated treatment well    Plan  Physical Therapy Plan  General Plan: Discharge with evaluation only  Safety Devices  Type of Devices: Nurse notified, Call light within reach, Chair alarm in place, Left in chair, Gait belt    Restrictions  Restrictions/Precautions  Restrictions/Precautions: Fall Risk     Subjective  General  Diagnosis: orthostasis  Subjective  Subjective: Pt states she is not dizzy or lightheaded and ready to amb with PT. has no concerns about dc home         Social/Functional History  Social/Functional History  Lives With: Spouse  Type of Home: Senior housing apartment  Home Layout: One level  Home Access: Elevator, Stairs to enter with rails  Bathroom Shower/Tub: Tub/Shower unit  Bathroom Toilet: Standard  Prior Level of Assist for ADLs: Independent  Prior 
Sheyla Pyle arrived to room # 319.   Presented with: Syncope and Collapse  Mental Status: Patient is oriented, alert, coherent, logical, thought processes intact, and able to concentrate and follow conversation.   Vitals:    12/19/24 0245   BP: (!) 138/49   Pulse: 97   Resp: 20   Temp: 98.8 °F (37.1 °C)   SpO2: 96%     Patient safety contract and falls prevention contract reviewed with patient Yes.  Oriented Patient to room.  Call light within reach. Yes.  Needs, issues or concerns expressed at this time: no.      Electronically signed by Yessenia Phan LPN on 12/19/2024 at 4:24 AM    
functional mobility in room without device.  pt tendency to scissor cross legs, and has LOB requiring Min-Mod A from therapist to recover balance safely.  At this time would recommend pt with DME of RW to improve safety and balance or even Rollator but pt not interested in using 2/2 unable to walk dog with device        Bed mobility  Supine to Sit: Minimal assistance  Sit to Supine: Minimal assistance  Scooting: Minimal assistance  Transfers  Sit to stand: Minimal assistance  Stand to sit: Minimal assistance  Transfer Comments: Min A for balance and safety with all transfers; Min-Mod A for LOB moments     Cognition  Overall Cognitive Status: WFL        LUE AROM (degrees)  LUE AROM : WFL  Left Hand AROM (degrees)  Left Hand AROM: WFL  RUE AROM (degrees)  RUE AROM : WFL  Right Hand AROM (degrees)  Right Hand AROM: WFL        Plan   Occupational Therapy Plan  Times Per Week: 3-5x/wk       Goals  Short Term Goals  Time Frame for Short Term Goals: 1 week  Short Term Goal 1: Pt will be Modified I for toileting task/transfer  Short Term Goal 2: Pt will be Modified I for functional mobility to/from bathroom  Short Term Goal 3: Pt will be able to complete grooming tasks standing at sink with Modified I and without LOB  Short Term Goal 4: Pt will be Modified I for LE dressing to don/doff socks  Short Term Goal 5: Will establish safe transfers with use of DME vs no DME  Patient Goals   Patient goals : Pt wishes to return home        Electronically signed by Ana Rosa Tamayo OT on 12/19/2024 at 12:07 PM      
No

## 2024-12-19 NOTE — ED PROVIDER NOTES
Ketones, Urine TRACE (*)     Leukocyte Esterase, Urine TRACE (*)     All other components within normal limits   GASTROINTESTINAL PANEL, MOLECULAR   TROPONIN   MAGNESIUM   LACTIC ACID   MICROSCOPIC URINALYSIS   TROPONIN   COMPREHENSIVE METABOLIC PANEL W/ REFLEX TO MG FOR LOW K   PHOSPHORUS   CBC WITH AUTO DIFFERENTIAL       All other labs were within normal range or not returned as of this dictation.    EMERGENCY DEPARTMENT COURSE and DIFFERENTIAL DIAGNOSIS/MDM:   Vitals:    Vitals:    12/19/24 0100 12/19/24 0130 12/19/24 0215 12/19/24 0245   BP: (!) 115/50 (!) 141/73 (!) 124/56 (!) 138/49   Pulse: 90 (!) 102 93 97   Resp: 17 22 19 20   Temp:   98.7 °F (37.1 °C) 98.8 °F (37.1 °C)   TempSrc:   Oral Oral   SpO2:  96% 94% 96%   Weight:    74.5 kg (164 lb 5 oz)   Height:    1.676 m (5' 6\")       MDM     Amount and/or Complexity of Data Reviewed  Clinical lab tests: reviewed  Tests in the radiology section of CPT®: reviewed  Tests in the medicine section of CPT®: reviewed    84-year-old female presents to the emergency department after brief loss of consciousness.  Patient was sitting down when she became lightheaded, nauseated, and broke out into a sweat.  Bystanders state that patient passed out \"for a few seconds.\"  Lab, EKG, and radiology results reviewed.  Patient given normal saline 1 L IV and Zofran 4 mg IV.  When IV fluids nearly complete, patient stated she was ready to be discharged.  Orthostatics were performed.  Upon standing, patient's blood pressure dropped to 88/40 and patient became pale and began to lose consciousness.  The nurse and I quickly put patient back into bed.  Patient had several episodes of emesis at that time.  Patient received another Zofran 4 mg IV injection.  Discussed with , hospitalist, who will admit patient for further evaluation and treatment.  Patient and son verbalized understanding and agreement with plan of care.      PROCEDURES:  Unless otherwise noted below,

## 2024-12-19 NOTE — DISCHARGE INSTRUCTIONS
Drink at least 60 to 80 ounces of fluids daily.  Follow-up with your doctor or with [facility] within 1 to 4 days.  Rise slowly.  If you should feel dizzy sit back down or lay down.  Return or seek medical attention with chest pain, palpitations, dizziness, shortness of breath, black or bloody stools, or other concerns.

## 2024-12-20 NOTE — CARE COORDINATION
Dme ordered from [employer]. Walker will be delivered to pts room.  [facility]  Electronically signed by Ivis Hernandes on 12/19/2024 at 1:35 PM   
Pt with no preference for HH; [facility] can accept and see tomorrow per Maria Esther; faxed referral also to [phone]    Electronically signed by YESENIA Huynh on 12/19/2024 at 4:41 PM  12/19/24 1600  Inpatient consult to Home Care Needs  ONE TIME     Complete  Discontinue     Comments: Certification and medical necessity: I certify that, based on my findings, the following services are medically necessary home health services for Sheyla Pyle for the following reasons: - Vital signs monitoring: Nurse to perform BP, HR, RR, Temp, and Weight with each visit and teach patient and caregivers on taking daily.  Nurse to call physician if pulse less than 50 or greater than 120, respiratory rate less than 12 or greater than 25, oral temperature greater than or equal to 101 oF, systolic BP less than 90 or greater than 170, diastolic BP less than 50 or greater than 100.  - Consult Physical Therapy for  Evaluate and Treat  - Consult Occupational Therapy for  Evaluate and Treat   Provider: (Not yet assigned)   Question Answer Comment   Reason for Consult? Home Care Orders    I certify that I, or a nurse practitioner or physician assistant working with me, had an in-person encounter with the patient and the reason for the home care services is documented in the clinical note on: 12/19/2024    Will the referring provider be the attending provider for home health? No    Name of attending provider for home health PCP    The patient is confined to home: Due to illness or injury that necessitates supportive device aid, use of special transportation, or assistance of another person to leave home, AND there is a normal inability to leave home, AND leaving home requires considerable taxing effort    Patient requires the following home health services Skilled Nursing    Patient requires the following home health services Physical Therapy    Patient requires the following home health services Occupational Therapy            
SW visited with Pt and son, at bedside, re: d/c today; per PT, Tonia, it is recommended that she have someone with her for first 24h at minimum to adjust to home; Pt stated she is staying here with him until 12/27 when she flies back home to [place]; she refused walker as she noted she has one at home in [place] (but not here); SW offered cane from donations; Pt declined.     Electronically signed by YESENIA Huynh on 12/19/2024 at 3:41 PM    
Update 12/20/24    Funmilayo Maximo Funmilayo    NPI: [id number]  [address] [facility] [address]     Phone: [phone]      SW spoke with Pt's son today for his address as demo sheet only has [place] address; he is at [address] [place] [address]; Per Maria Esther at [facility] they cannot accept as Pt in [place]; [facility] cannot accept due to staffing; referral also sent to [facility] and [facility] ; awaiting response.    He also asked if meds that were escribed to a local pharmacy can be transferred to one closer to him; SW contacted [facility] and asked for them to transfer to below pharmacy, rep stated that we need to call [facility] and they can request from them. MATILDE spoke with rep at [facility] she will contact [facility] - [address] [phone] - F [phone]       Electronically signed by YESENIA Huynh on 12/20/2024 at 11:14 AM    **[facility]  also not able to accept Pt.; Pt stated she didn't think she needed HH.   Electronically signed by YESENIA Huynh on 12/20/2024 at 4:44 PM      
results found for: \"PHART\", \"PO2ART\", \"[id number]\"  Urinalysis:        Lab Results   Component Value Date/Time     NITRU Negative 12/18/2024 10:42 PM     WBCUA 1 12/18/2024 10:42 PM     BACTERIA Negative 12/18/2024 10:42 PM     RBCUA 3 12/18/2024 10:42 PM     BLOODU Negative 12/18/2024 10:42 PM     GLUCOSEU Negative 12/18/2024 10:42 PM              Active Hospital Problems     Diagnosis Date Noted    Syncope and collapse [R55] 12/19/2024         Assessment and Plan:  Principal Problem:    Syncope and collapse  Resolved Problems:    * No resolved hospital problems. *     # Syncope and collapse  # Orthostatic hypotension  - Admit to observation  - Monitor on telemetry  - PT/OT to evaluate and treat  - IV fluids LR at 75 cc/h  - Hold home antihypertensives for now  - Repeat labs in the morning  - Echocardiogram in the morning     DVT prophylaxis with Lovenox         Dr. Holbrook [facility]  Internal Medicine    This note was recorded using dictation software and has been proofread for accuracy but there may be grammatical inaccuracies due to missed recognition of speech within the dictation software.  Please apply one's own clinical judgment and clarify any details that may not be clear.